# Patient Record
(demographics unavailable — no encounter records)

---

## 2025-03-04 NOTE — HISTORY OF PRESENT ILLNESS
[FreeTextEntry2] : Ysabel is a 16 year 4 month old female with type 2 diabetes who returns for follow up. She has a strong family history of type 2 diabetes. Mother had GDM requiring insulin during her pregnancy with Ysabel. Father had many diabetes complications (leg amputation) and passed away in 1/2022. Ysabel presented to urgent care in 10/2021 due to increased urination, abdominal pain and nausea x 1 week. She was noted to have a glucose of 419 mg/dL. Her pediatrician started metformin which she only took for a few days; he also did blood work on 10/15/21 that showed: abnormal lipid panel (total 243, HDL 50, ), CMP (glucose 335 mg/dL, AST 52 U/L (H), ALT 49 U/L (H)), c-peptide 4.11 ng/mL (H), insulin 23.1 uU/mL (H), A1c 12.1 % (H). Of note - prior blood work from 7/7/21 showed: lipid panel( total 205, , HDL 46, ), TSH 4.65 uIU/mL, free T4 1.27 ng/dL, negative TPO and thyroglobulin Abs.  After the lab work returned, she was sent to [facility] ED on 10/19/21 and labs showed: glucose POCT 309, 264 on BMP, pH 7.39, HCO3 21, UA (ketones negative, >1000 glucose); negative LEON Ab, islet cell Ab and zinc transporter 8 Ab. Patient was given NS bolus x 1. Lantus 10 units. She was seen by Dr. Rock at [facility]. Father passed away in 1/2022. She then transitioned care to me in 9/2022 (A1c 7.1 %). Stopped taking metformin in 2024 due to abdominal discomfort. She last saw nursing in 3/2024 (A1c 9.1 %) and me in 9/2024 (A1c 8.5 %).  Ysabel now returns for follow up and her A1c is 8.8 %. Review of her Dexcom report shows that her average glucose over the last 2 weeks was 241 mg/dL +/- 72. She is in target range 25 % of the time, high 32 %, very high 43 % and low 0 %. She often takes her insulin after eating or within 10 min of eating. She has been taking Lantus 40 units instead of 58.   Has SATs this month - has been stressed with school.  Mother's A1c improved since she herself has been wearing the Dexcom. Wants to consider Omnipod with Ysabel.   plan lantus 45 to 48 target 120 to 110 cf 30 to 25 ic 8 to 7 labs [Regular Periods] : regular periods [FreeTextEntry1] : Menarche 12/2020; LMP 1/28/25

## 2025-07-01 NOTE — SCHOOL
[Type 2 Diabetes] : Type 2 Diabetes [___ PROVIDER INITIALS] : : ___[unfilled] [] : _x [Dr. Romi Villanueva] : Dr. Romi Villanueva - License [id number] [[place] Office] : [address] [facility] [address] [facility] [facility] [address], [place] [address] [[place] Phone #] : Tel. [phone]    Fax. [phone]  [Today's Date] : [unfilled] [1 mg] : 1  mg SC/IM [_____] : _x _ Insulin name: [unfilled] [Insulin: _____] : Insulin: [unfilled] [Other: _____] :  Other: [unfilled] [de-identified] : 10/2021 [FreeTextEntry6] : 07/01/25 [FreeTextEntry7] : 8.7

## 2025-07-01 NOTE — CONSULT LETTER
[Dear  ___] : Dear  [unfilled], [Courtesy Letter:] : I had the pleasure of seeing your patient, [unfilled], in my office today. [Please see my note below.] : Please see my note below. [Sincerely,] : Sincerely, [FreeTextEntry3] : Romi Villanueva DO

## 2025-07-01 NOTE — PHYSICAL EXAM
[Healthy Appearing] : healthy appearing [Well Nourished] : well nourished [Interactive] : interactive [Obese] : obese [Mild Lipohypertrophy of Arms] : no mild lipohypertrophy lateral aspects of arms [Normal Appearance] : normal appearance [Well formed] : well formed [Normally Set] : normally set [Goiter] : no goiter [Abdomen Soft] : soft [Abdomen Tenderness] : non-tender [] : no hepatosplenomegaly [Normal] : normal

## 2025-07-01 NOTE — DISCUSSION/SUMMARY
[FreeTextEntry1] : Ysabel is a 16 year 8 month old female with type 2 diabetes who returns for follow up. Her A1c today is 8.7 % - which indicates moderate glycemic control. Review of her Dexcom report shows that her average glucose over the last 2 weeks was 235 mg/dL +/- 62. She is in target range 22 % of the time, high 37 %, very high 41 % and low 0 %. Diabetes is a chronic condition and improved glycemic control is necessary in order to decrease Ysabel's future risk of developing acute and/or chronic complications. Recommend changing Lantus from 48 to 52 units day. Also changed CF from 25 to 20 and IC from 7 to 5. Of note - Ysabel would like to try metformin again - prescribed metformin  mg daily - to titrate up to 1000 mg daily with food. Stressed the need to bolus prior to meals. Discussed again transitioning to a pump. Family instructed to contact our office if persistent hyper or hypoglycemia. Reordered screening labs to be performed fasting - CMP, lipid panel, urine microalbumin. Next follow up with me in 3 and 6 months. Nutrition visit - food logs, to consider pump.    School forms provided.

## 2025-07-01 NOTE — HISTORY OF PRESENT ILLNESS
[Regular Periods] : regular periods [FreeTextEntry2] : Ysabel is a 16 year 8 month old female with type 2 diabetes who returns for follow up. She has a strong family history of type 2 diabetes. Mother had GDM requiring insulin during her pregnancy with Ysabel. Father had many diabetes complications (leg amputation) and passed away in 1/2022. Ysabel presented to urgent care in 10/2021 due to increased urination, abdominal pain and nausea x 1 week. She was noted to have a glucose of 419 mg/dL. Her pediatrician started metformin which she only took for a few days; he also did blood work on 10/15/21 that showed: abnormal lipid panel (total 243, HDL 50, ), CMP (glucose 335 mg/dL, AST 52 U/L (H), ALT 49 U/L (H)), c-peptide 4.11 ng/mL (H), insulin 23.1 uU/mL (H), A1c 12.1 % (H). Of note - prior blood work from 7/7/21 showed: lipid panel( total 205, , HDL 46, ), TSH 4.65 uIU/mL, free T4 1.27 ng/dL, negative TPO and thyroglobulin Abs.  After the lab work returned, she was sent to [facility] ED on 10/19/21 and labs showed: glucose POCT 309, 264 on BMP, pH 7.39, HCO3 21, UA (ketones negative, >1000 glucose); negative LEON Ab, islet cell Ab and zinc transporter 8 Ab. Patient was given NS bolus x 1. Lantus 10 units. She was seen by Dr. Rock at [facility]. Father passed away in 1/2022. She then transitioned care to me in 9/2022 (A1c 7.1 %). Stopped taking metformin in 2024 due to abdominal discomfort. She last saw me in 9/2024 (A1c 8.5 %) and 3/2025 (A1c 8.8 %).  Ysabel now returns for follow up and her A1c is 8.7 %. Review of her Dexcom report shows that her average glucose over the last 2 weeks was 235 mg/dL +/- 62. She is in target range 22 % of the time, high 37 %, very high 41 % and low 0 %. She still takes her insulin after eating or within 10 min of eating. She has been taking Lantus 48 units. She says she is always high even if she gives insulin - she gives short acting ~5-6 times/day. Wants to restart metformin.   Has SATs this month - has been stressed with school.  Mother's A1c improved since she herself has been wearing the Dexcom. Wants to consider Omnipod with Ysabel.      [FreeTextEntry1] : Menarche 12/2020; LMP 6/17/25